# Patient Record
Sex: FEMALE | URBAN - METROPOLITAN AREA
[De-identification: names, ages, dates, MRNs, and addresses within clinical notes are randomized per-mention and may not be internally consistent; named-entity substitution may affect disease eponyms.]

---

## 2017-11-27 ENCOUNTER — HOSPITAL ENCOUNTER (OUTPATIENT)
Age: 62
Setting detail: SPECIMEN
Discharge: HOME | End: 2017-11-27
Payer: COMMERCIAL

## 2017-11-27 ENCOUNTER — OFFICE VISIT (OUTPATIENT)
Dept: OBGYN CLINIC | Age: 62
End: 2017-11-27
Payer: COMMERCIAL

## 2017-11-27 VITALS
WEIGHT: 124 LBS | DIASTOLIC BLOOD PRESSURE: 68 MMHG | SYSTOLIC BLOOD PRESSURE: 124 MMHG | HEIGHT: 64 IN | BODY MASS INDEX: 21.17 KG/M2

## 2017-11-27 DIAGNOSIS — Z12.31 ENCOUNTER FOR SCREENING MAMMOGRAM FOR MALIGNANT NEOPLASM OF BREAST: ICD-10-CM

## 2017-11-27 DIAGNOSIS — Z01.419 ENCOUNTER FOR ROUTINE GYNECOLOGICAL EXAMINATION WITH PAPANICOLAOU SMEAR OF CERVIX: Primary | ICD-10-CM

## 2017-11-27 PROCEDURE — 99396 PREV VISIT EST AGE 40-64: CPT | Performed by: OBSTETRICS & GYNECOLOGY

## 2017-11-27 ASSESSMENT — ENCOUNTER SYMPTOMS
WHEEZING: 0
CONSTIPATION: 0
HEARTBURN: 0
ABDOMINAL PAIN: 0
NAUSEA: 0
COUGH: 0
VOMITING: 0
DOUBLE VISION: 0
DIARRHEA: 0
BLURRED VISION: 0
ORTHOPNEA: 0

## 2017-11-27 NOTE — PROGRESS NOTES
[facility] PHYSICIANS  [facility] [address] [address],[address]  [address]  [address]  Dept: [phone]    DATE OF VISIT:  17        History and Physical    Parker Mahmood    :  1955  CHIEF COMPLAINT:    Chief Complaint   Patient presents with    Annual Exam     Last pap 10/10/16 neg, HPV-, had not had a mammogram in several years                    HPI :   Parker Mahmood is a 63 y.o. female who presents for her annual exam. Overdue for her mammogram. Last pap was negative. Had a sister in law who passed away from ovarian cancer so wonders about screening for this. Occasional vaginal dryness with intercourse, improved with OTC lubrication. Leakage of urine while playing tennis but wears a pad and this does not bother her much. Prefers a chaperone for exams. _____________________________________________________________________  Past Medical History:   Diagnosis Date    Abnormal Pap smear 10/04/99    hsil    Hayfever     fall-ragweed                                                                   Past Surgical History:   Procedure Laterality Date    COLPOSCOPY      CHERYLE      TUBAL LIGATION  [id number]     Family History   Problem Relation Age of Onset    Heart Disease Father      CHF    Alzheimer's Disease Mother     Other Mother      pneumomia    Heart Defect Brother     No Known Problems Maternal Grandmother     No Known Problems Maternal Grandfather     No Known Problems Paternal Grandmother     No Known Problems Paternal Grandfather     Heart Disease Brother      History   Smoking Status    Never Smoker   Smokeless Tobacco    Never Used     History   Alcohol Use    1.2 oz/week    1 Cans of beer, 1 Standard drinks or equivalent per week     Comment: 1/week     Current Outpatient Prescriptions   Medication Sig Dispense Refill    NONFORMULARY Take 2 tablets by mouth daily For dry eye      Multiple Vitamins-Minerals (MULTIVITAMIN PO) Take  by mouth.       VITAMIN E PO Take  by mouth. No current facility-administered medications for this visit. Allergies:  Review of patient's allergies indicates no known allergies. Gynecologic History:  No LMP recorded. Patient is postmenopausal. Last period 3-4 years ago, no bleeding  Sexually Active: Yes  STD History: No      Obstetric History       T0      L3     SAB0   TAB0   Ectopic0   Molar0   Multiple0   Live Births3      ______________________________________________________________________  REVIEW OF SYSTEMS:        Review of Systems   Constitutional: Negative for chills, fever and weight loss. HENT: Negative for hearing loss. Eyes: Negative for blurred vision and double vision. Respiratory: Negative for cough and wheezing. Cardiovascular: Negative for chest pain, palpitations and orthopnea. Gastrointestinal: Negative for abdominal pain, constipation, diarrhea, heartburn, nausea and vomiting. Genitourinary: Negative for dysuria, frequency and urgency. Musculoskeletal: Negative for joint pain and myalgias. Skin: Negative for rash. Neurological: Negative for dizziness, tingling, focal weakness, weakness and headaches. Endo/Heme/Allergies: Does not bruise/bleed easily. Psychiatric/Behavioral: Negative for depression, substance abuse and suicidal ideas. The patient does not have insomnia. /68   Ht 5' 4\" (1.626 m)   Wt 124 lb (56.2 kg)   Breastfeeding? No   BMI 21.28 kg/m²                                                                                                                                              Physical Exam:     Physical Exam   Constitutional: She is oriented to person, place, and time. She appears well-developed and well-nourished. HENT:   Head: Normocephalic. Neck: No JVD present. No thyromegaly present. Cardiovascular: Normal rate and regular rhythm. Pulmonary/Chest: Effort normal and breath sounds normal. No respiratory distress. sent for co-testing per ASCCP guidelines. -Menopause symptoms discussed. No hot flushes. Recommended continued water based lubrication for vaginal dryness. Discussed escalating to topical estrogen if this does not help. - Stress incontinence present; discussed options such as Kegel's, pelvic floor PT, pessary and surgery; pt states it's not bothersome enough for intervention.   - Screening mammogram discussed and advised yearly if normal starting at age 36. Discussed that there is no screening test for ovarian or uterine cancer, but we do recommend annual exam to assess for bleeding and pelvic masses, and urged patient to report any symptoms of constipation, early satiety, pelvic pain, or increased abdominal girth. - Routine health maintenance per patients PCP.     Electronically signed by Amaury Cheek MD on 11/27/2017 at 2:04 [address] [facility]

## 2017-12-01 LAB — CYTOLOGY REPORT: NORMAL

## 2019-02-14 ENCOUNTER — HOSPITAL ENCOUNTER (OUTPATIENT)
Age: 64
Setting detail: SPECIMEN
Discharge: HOME | End: 2019-02-14
Payer: COMMERCIAL

## 2019-02-14 ENCOUNTER — OFFICE VISIT (OUTPATIENT)
Dept: OBGYN CLINIC | Age: 64
End: 2019-02-14
Payer: COMMERCIAL

## 2019-02-14 VITALS
HEIGHT: 64 IN | DIASTOLIC BLOOD PRESSURE: 74 MMHG | WEIGHT: 125 LBS | SYSTOLIC BLOOD PRESSURE: 122 MMHG | BODY MASS INDEX: 21.34 KG/M2

## 2019-02-14 DIAGNOSIS — Z01.419 ENCOUNTER FOR GYNECOLOGICAL EXAMINATION: Primary | ICD-10-CM

## 2019-02-14 DIAGNOSIS — Z12.31 ENCOUNTER FOR SCREENING MAMMOGRAM FOR BREAST CANCER: ICD-10-CM

## 2019-02-14 DIAGNOSIS — Z12.11 COLON CANCER SCREENING: ICD-10-CM

## 2019-02-14 DIAGNOSIS — N39.3 SUI (STRESS URINARY INCONTINENCE, FEMALE): ICD-10-CM

## 2019-02-14 PROCEDURE — 99396 PREV VISIT EST AGE 40-64: CPT | Performed by: OBSTETRICS & GYNECOLOGY

## 2019-02-14 ASSESSMENT — ENCOUNTER SYMPTOMS
VOMITING: 0
ABDOMINAL PAIN: 0
CONSTIPATION: 0
WHEEZING: 0
NAUSEA: 0
DIARRHEA: 0
COUGH: 0

## 2019-02-28 LAB — CYTOLOGY REPORT: NORMAL

## 2019-03-21 DIAGNOSIS — Z12.11 COLON CANCER SCREENING: ICD-10-CM

## 2019-03-21 LAB
CONTROL: PRESENT
HEMOCCULT STL QL: NEGATIVE

## 2019-03-21 PROCEDURE — 82274 ASSAY TEST FOR BLOOD FECAL: CPT | Performed by: OBSTETRICS & GYNECOLOGY

## 2019-12-20 ENCOUNTER — OFFICE VISIT (OUTPATIENT)
Dept: OBGYN CLINIC | Age: 64
End: 2019-12-20
Payer: COMMERCIAL

## 2019-12-20 VITALS
SYSTOLIC BLOOD PRESSURE: 150 MMHG | WEIGHT: 127 LBS | BODY MASS INDEX: 21.68 KG/M2 | HEIGHT: 64 IN | DIASTOLIC BLOOD PRESSURE: 80 MMHG

## 2019-12-20 DIAGNOSIS — Z46.89 ENCOUNTER FOR PESSARY MAINTENANCE: Primary | ICD-10-CM

## 2019-12-20 PROCEDURE — 99212 OFFICE O/P EST SF 10 MIN: CPT | Performed by: OBSTETRICS & GYNECOLOGY

## 2023-05-18 ENCOUNTER — HOSPITAL ENCOUNTER (OUTPATIENT)
Age: 68
Setting detail: SPECIMEN
Discharge: HOME | End: 2023-05-18

## 2023-05-18 ENCOUNTER — OFFICE VISIT (OUTPATIENT)
Dept: OBGYN CLINIC | Age: 68
End: 2023-05-18
Payer: COMMERCIAL

## 2023-05-18 VITALS
DIASTOLIC BLOOD PRESSURE: 71 MMHG | BODY MASS INDEX: 22.02 KG/M2 | HEART RATE: 41 BPM | SYSTOLIC BLOOD PRESSURE: 148 MMHG | WEIGHT: 129 LBS | HEIGHT: 64 IN

## 2023-05-18 DIAGNOSIS — Z12.11 ENCOUNTER FOR SCREENING COLONOSCOPY: ICD-10-CM

## 2023-05-18 DIAGNOSIS — Z01.419 ENCOUNTER FOR WELL WOMAN EXAM WITH ROUTINE GYNECOLOGICAL EXAM: Primary | ICD-10-CM

## 2023-05-18 DIAGNOSIS — Z12.31 ENCOUNTER FOR SCREENING MAMMOGRAM FOR MALIGNANT NEOPLASM OF BREAST: ICD-10-CM

## 2023-05-18 DIAGNOSIS — N39.3 SUI (STRESS URINARY INCONTINENCE, FEMALE): ICD-10-CM

## 2023-05-18 DIAGNOSIS — Z13.820 SCREENING FOR OSTEOPOROSIS: ICD-10-CM

## 2023-05-18 PROBLEM — M72.2 PLANTAR FASCIAL FIBROMATOSIS: Status: ACTIVE | Noted: 2019-08-19

## 2023-05-18 PROBLEM — H52.209 ASTIGMATISM: Status: ACTIVE | Noted: 2017-02-20

## 2023-05-18 PROBLEM — F41.9 ANXIETY: Status: ACTIVE | Noted: 2022-09-08

## 2023-05-18 PROBLEM — H52.4 BILATERAL PRESBYOPIA: Status: ACTIVE | Noted: 2017-02-20

## 2023-05-18 PROCEDURE — 99387 INIT PM E/M NEW PAT 65+ YRS: CPT | Performed by: STUDENT IN AN ORGANIZED HEALTH CARE EDUCATION/TRAINING PROGRAM

## 2023-05-18 RX ORDER — CYCLOSPORINE 0.5 MG/ML
EMULSION OPHTHALMIC
COMMUNITY
Start: 2023-05-07

## 2023-05-18 NOTE — PROGRESS NOTES
Christian Banegas  2023              79 y.o. Chief Complaint   Patient presents with    New Patient         No LMP recorded. Patient is postmenopausal.           Primary Care Physician: Karl Freedman MD    HPI : Christian Banegas is a 79 y.o. female     Patient is here today for her well women annual exam. She was seen and examined. Patient has no chief complaint today. States she has stress UI and it is becoming more bothersome to her. She is not ready for surgery yet. She does Kegels at home but does not think they are working. When she plays tennis she sometimes urinates through a thick pad. Also reports she feels like her urine smells stronger or more abnormal. She is still sexually active. Denies any PMB episodes.     ________________________________________________________________________  [facility] History    Para Term  AB Living   3 3 0 0 0 3   SAB IAB Ectopic Molar Multiple Live Births   0 0 0   0 3      # Outcome Date GA Lbr Reuben/2nd Weight Sex Delivery Anes PTL Lv   3 Para    6 lb (2.722 kg) F Vag-Spont   JENSEN   2 Para    8 lb 2 oz (3.685 kg) M Vag-Spont   JENSEN   1 Para    7 lb (3.175 kg) M Vag-Spont   JENSEN     Past Medical History:   Diagnosis Date    Abnormal Pap smear 10/04/99    hsil    Hayfever     fall-ragweed                                                                   Past Surgical History:   Procedure Laterality Date    COLPOSCOPY      LEEP      TUBAL LIGATION  [id number]     Family History   Problem Relation Age of Onset    Heart Disease Father         CHF    Alzheimer's Disease Mother     Other Mother         pneumomia    Heart Defect Brother     No Known Problems Maternal Grandmother     No Known Problems Maternal Grandfather     No Known Problems Paternal Grandmother     No Known Problems Paternal Grandfather     Heart Disease Brother      Social History     Socioeconomic History    Marital status: Single     Spouse name: Not on file    Number of children: Not

## 2023-05-19 LAB
HPV SAMPLE: NORMAL
HPV, GENOTYPE 16: NOT DETECTED
HPV, GENOTYPE 18: NOT DETECTED
HPV, HIGH RISK OTHER: NOT DETECTED
HPV, INTERPRETATION: NORMAL
SPECIMEN DESCRIPTION: NORMAL

## 2023-05-24 LAB — CYTOLOGY REPORT: NORMAL

## 2023-07-06 ENCOUNTER — HOSPITAL ENCOUNTER (OUTPATIENT)
Dept: MAMMOGRAPHY | Age: 68
Discharge: HOME | End: 2023-07-08
Payer: COMMERCIAL

## 2023-07-06 DIAGNOSIS — Z13.820 SCREENING FOR OSTEOPOROSIS: ICD-10-CM

## 2023-07-06 DIAGNOSIS — Z12.31 ENCOUNTER FOR SCREENING MAMMOGRAM FOR MALIGNANT NEOPLASM OF BREAST: ICD-10-CM

## 2023-07-06 PROCEDURE — 77080 DXA BONE DENSITY AXIAL: CPT

## 2023-07-06 PROCEDURE — 77063 BREAST TOMOSYNTHESIS BI: CPT
